# Patient Record
Sex: MALE | URBAN - METROPOLITAN AREA
[De-identification: names, ages, dates, MRNs, and addresses within clinical notes are randomized per-mention and may not be internally consistent; named-entity substitution may affect disease eponyms.]

---

## 2019-03-28 ENCOUNTER — TELEPHONE (OUTPATIENT)
Dept: DERMATOLOGY | Facility: CLINIC | Age: 54
End: 2019-03-28

## 2019-03-28 NOTE — TELEPHONE ENCOUNTER
[facility] Call Center    Phone Message    May a detailed message be left on voicemail: yes    Reason for Call: Other: Potential Pt wants to speak with someone regarding what our preferred procedure is for epidermal Cyst removal.  Please call to discuss possible treatments     Action Taken: Message routed to:  [facility] ([facility]): dermatology

## 2019-03-28 NOTE — TELEPHONE ENCOUNTER
Returned Faustino's phone call regarding questions on cyst removal.     Faustino is uninsured and was looking for prices. He was wondering how much an office visit would be and the cyst removal. I informed him I do not work in billing and if he is willing to hold I could get someone on the line from billing.     I spoke to Parveen and he spoke with Faustino and gave him an estimate of what an appointment could cost. Patient hung up on Parveen when he gave patient the quote for estimate.       Pauly Doanto RN